# Patient Record
Sex: MALE | URBAN - METROPOLITAN AREA
[De-identification: names, ages, dates, MRNs, and addresses within clinical notes are randomized per-mention and may not be internally consistent; named-entity substitution may affect disease eponyms.]

---

## 2018-05-29 ENCOUNTER — HISTORICAL (OUTPATIENT)
Dept: ADMINISTRATIVE | Facility: HOSPITAL | Age: 23
End: 2018-05-29

## 2018-05-29 LAB
ABS NEUT (OLG): 2.8 X10(3)/MCL (ref 2.1–9.2)
APPEARANCE, UA: CLEAR
BACTERIA #/AREA URNS AUTO: ABNORMAL /[HPF]
BASOPHILS # BLD AUTO: 0.02 X10(3)/MCL
BASOPHILS NFR BLD AUTO: 0 %
BILIRUB UR QL STRIP: NEGATIVE
BUN SERPL-MCNC: 16 MG/DL (ref 7–18)
CALCIUM SERPL-MCNC: 9.1 MG/DL (ref 8.5–10.1)
CHLORIDE SERPL-SCNC: 105 MMOL/L (ref 98–107)
CO2 SERPL-SCNC: 30 MMOL/L (ref 21–32)
COLOR UR: YELLOW
CREAT SERPL-MCNC: 0.9 MG/DL (ref 0.6–1.3)
CREAT/UREA NIT SERPL: 18
EOSINOPHIL # BLD AUTO: 0.16 10*3/UL
EOSINOPHIL NFR BLD AUTO: 3 %
ERYTHROCYTE [DISTWIDTH] IN BLOOD BY AUTOMATED COUNT: 11.8 % (ref 11.5–14.5)
EST. AVERAGE GLUCOSE BLD GHB EST-MCNC: 108 MG/DL
GLUCOSE (UA): NORMAL
GLUCOSE SERPL-MCNC: 88 MG/DL (ref 74–106)
HAV IGM SERPL QL IA: NONREACTIVE
HBA1C MFR BLD: 5.4 % (ref 4.2–6.3)
HBV CORE IGM SERPL QL IA: NONREACTIVE
HBV SURFACE AG SERPL QL IA: NEGATIVE
HCT VFR BLD AUTO: 46.9 % (ref 40–51)
HCV AB SERPL QL IA: NONREACTIVE
HGB BLD-MCNC: 15.3 GM/DL (ref 13.5–17.5)
HGB UR QL STRIP: NEGATIVE
HIV 1+2 AB+HIV1 P24 AG SERPL QL IA: NONREACTIVE
HYALINE CASTS #/AREA URNS LPF: ABNORMAL /[LPF]
IMM GRANULOCYTES # BLD AUTO: 0.02 10*3/UL
IMM GRANULOCYTES NFR BLD AUTO: 0 %
KETONES UR QL STRIP: NEGATIVE
LEUKOCYTE ESTERASE UR QL STRIP: NEGATIVE
LYMPHOCYTES # BLD AUTO: 2.56 X10(3)/MCL
LYMPHOCYTES NFR BLD AUTO: 43 % (ref 13–40)
MCH RBC QN AUTO: 27.5 PG (ref 26–34)
MCHC RBC AUTO-ENTMCNC: 32.6 GM/DL (ref 31–37)
MCV RBC AUTO: 84.2 FL (ref 80–100)
MONOCYTES # BLD AUTO: 0.43 X10(3)/MCL
MONOCYTES NFR BLD AUTO: 7 % (ref 4–12)
NEUTROPHILS # BLD AUTO: 2.8 X10(3)/MCL
NEUTROPHILS NFR BLD AUTO: 47 X10(3)/MCL
NITRITE UR QL STRIP: NEGATIVE
PH UR STRIP: 6 [PH] (ref 4.5–8)
PLATELET # BLD AUTO: 261 X10(3)/MCL (ref 130–400)
PMV BLD AUTO: 9 FL (ref 7.4–10.4)
POTASSIUM SERPL-SCNC: 4.1 MMOL/L (ref 3.5–5.1)
PROT UR QL STRIP: 10 MG/DL
RBC # BLD AUTO: 5.57 X10(6)/MCL (ref 4.5–5.9)
RBC #/AREA URNS AUTO: ABNORMAL /[HPF]
SODIUM SERPL-SCNC: 141 MMOL/L (ref 136–145)
SP GR UR STRIP: 1.03 (ref 1–1.03)
SQUAMOUS #/AREA URNS LPF: ABNORMAL /[LPF]
UROBILINOGEN UR STRIP-ACNC: NORMAL
WBC # SPEC AUTO: 6 X10(3)/MCL (ref 4.5–11)
WBC #/AREA URNS AUTO: ABNORMAL /HPF

## 2021-05-19 ENCOUNTER — HISTORICAL (OUTPATIENT)
Dept: INTERNAL MEDICINE | Facility: CLINIC | Age: 26
End: 2021-05-19

## 2021-05-19 LAB
ABS NEUT (OLG): 3.11 X10(3)/MCL (ref 2.1–9.2)
ALBUMIN SERPL-MCNC: 4.3 GM/DL (ref 3.5–5)
ALBUMIN/GLOB SERPL: 1.2 RATIO (ref 1.1–2)
ALP SERPL-CCNC: 81 UNIT/L (ref 40–150)
ALT SERPL-CCNC: 34 UNIT/L (ref 0–55)
AST SERPL-CCNC: 17 UNIT/L (ref 5–34)
BASOPHILS # BLD AUTO: 0 X10(3)/MCL (ref 0–0.2)
BASOPHILS NFR BLD AUTO: 0 %
BILIRUB SERPL-MCNC: 0.6 MG/DL
BILIRUBIN DIRECT+TOT PNL SERPL-MCNC: 0.2 MG/DL (ref 0–0.5)
BILIRUBIN DIRECT+TOT PNL SERPL-MCNC: 0.4 MG/DL (ref 0–0.8)
BUN SERPL-MCNC: 13.7 MG/DL (ref 8.9–20.6)
CALCIUM SERPL-MCNC: 9.6 MG/DL (ref 8.4–10.2)
CHLORIDE SERPL-SCNC: 107 MMOL/L (ref 98–107)
CO2 SERPL-SCNC: 26 MMOL/L (ref 22–29)
CREAT SERPL-MCNC: 0.95 MG/DL (ref 0.73–1.18)
EOSINOPHIL # BLD AUTO: 0.2 X10(3)/MCL (ref 0–0.9)
EOSINOPHIL NFR BLD AUTO: 2 %
ERYTHROCYTE [DISTWIDTH] IN BLOOD BY AUTOMATED COUNT: 12 % (ref 11.5–14.5)
EST. AVERAGE GLUCOSE BLD GHB EST-MCNC: 102.5 MG/DL
GLOBULIN SER-MCNC: 3.5 GM/DL (ref 2.4–3.5)
GLUCOSE SERPL-MCNC: 95 MG/DL (ref 74–100)
HBA1C MFR BLD: 5.2 %
HCT VFR BLD AUTO: 48 % (ref 40–51)
HGB BLD-MCNC: 15.5 GM/DL (ref 13.5–17.5)
IMM GRANULOCYTES # BLD AUTO: 0.03 10*3/UL
IMM GRANULOCYTES NFR BLD AUTO: 0 %
LYMPHOCYTES # BLD AUTO: 3.2 X10(3)/MCL (ref 0.6–4.6)
LYMPHOCYTES NFR BLD AUTO: 46 %
MCH RBC QN AUTO: 27.6 PG (ref 26–34)
MCHC RBC AUTO-ENTMCNC: 32.3 GM/DL (ref 31–37)
MCV RBC AUTO: 85.6 FL (ref 80–100)
MONOCYTES # BLD AUTO: 0.4 X10(3)/MCL (ref 0.1–1.3)
MONOCYTES NFR BLD AUTO: 6 %
NEUTROPHILS # BLD AUTO: 3.11 X10(3)/MCL (ref 2.1–9.2)
NEUTROPHILS NFR BLD AUTO: 44 %
PLATELET # BLD AUTO: 283 X10(3)/MCL (ref 130–400)
PMV BLD AUTO: 8.6 FL (ref 7.4–10.4)
POTASSIUM SERPL-SCNC: 3.7 MMOL/L (ref 3.5–5.1)
PROT SERPL-MCNC: 7.8 GM/DL (ref 6.4–8.3)
RBC # BLD AUTO: 5.61 X10(6)/MCL (ref 4.5–5.9)
SODIUM SERPL-SCNC: 142 MMOL/L (ref 136–145)
WBC # SPEC AUTO: 7 X10(3)/MCL (ref 4.5–11)

## 2022-04-10 ENCOUNTER — HISTORICAL (OUTPATIENT)
Dept: ADMINISTRATIVE | Facility: HOSPITAL | Age: 27
End: 2022-04-10

## 2022-04-28 VITALS
BODY MASS INDEX: 34.01 KG/M2 | DIASTOLIC BLOOD PRESSURE: 71 MMHG | SYSTOLIC BLOOD PRESSURE: 104 MMHG | WEIGHT: 242.94 LBS | HEIGHT: 71 IN

## 2022-05-03 NOTE — HISTORICAL [facility]ER
This is a historical note converted from [employer]. Formatting and pictures may have been removed.  Please reference [employer] for original formatting and attached multimedia. Chief Complaint  ed follow up  History of Present Illness  22 yo  male presents today for routine f/up appt. Pt c/o acne to back and face sine? ~ 2010 for which he was treated here for acne. Reports over the years the back acne has worsened but facial acne is controlled with washing with cold water. Denies drainage, oozing, bleeding. 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 Denies tobacco, alcohol, illicit drug abuse. Exercises, drinks plenty of water, consumes moderately healthy diet. Denies fever, chills, night sweats, N/V, HA, blurry vision, dizziness, CP, SOB, or any other problems or concerns this visit.  Review of Systems  Constitutional: Negative.  Ear/Nose/Mouth/Throat: Negative.  Respiratory: No shortness of breath, No cough, No sputum production, No hemoptysis, No wheezing.  Cardiovascular: No chest pain, No palpitations, No peripheral edema, No syncope.  Breast: Negative.  Gastrointestinal: No nausea, No vomiting, No diarrhea, No constipation, No hematemesis Abdominal pain: All quadrants.  Genitourinary: Negative.  Hematology/Lymphatics: Negative.  Endocrine: Negative.  Immunologic: Negative.  Musculoskeletal: No back pain, No neck pain, No joint pain, No muscle pain, No decreased range of motion.  Integumentary: See HPI  Neurologic: Negative except as documented in history of present illness.  Physical Exam  Vitals & Measurements  T:?36.6? ?C (Oral)? HR:?78(Peripheral)? RR:?20? BP:?98/66?  HT:?180.5?cm? HT:?180.5?cm? WT:?99.8?kg? WT:?99.8?kg? BMI:?30.63?  General: Alert and oriented, No acute distress._  Eye: Pupils are equal, round and reactive to light, Extraocular movements are intact.  HENT: Normocephalic.  Neck: Supple, Non-tender, No carotid bruit, No lymphadenopathy.  Respiratory: Lungs are clear to  auscultation, Respirations are non-labored, Breath sounds are equal, Symmetrical chest wall expansion.  Cardiovascular: Normal rate, Regular rhythm, No murmur.  Gastrointestinal: Soft, Non-tender, Non-distended, Normal bowel sounds.  Musculoskeletal: Normal range of motion.  Integumentary: Warm, Dry, Intact. Large area to right flank, brown with hair. Multiple old acne scars to entire back, some nodules, no pustules noted.  Neurologic: No focal deficits, Cranial Nerves II-XII are grossly intact.  Assessment/Plan  1.?Well adult exam  ? Tdap today  Fasting labs  Ordered:  tetanus/diphth/pertuss (Tdap) adult/adol, 0.5 mL, form: Injection, IM, Once-Unscheduled, first dose 05/29/18 9:06:00 CDT  Basic Metabolic Panel, Routine collect, *Est. 05/29/18 3:00:00 CDT, Blood, Order for future visit, *Est. Stop date 05/29/18 3:00:00 CDT, Lab Collect, Well adult exam, 05/29/18 9:17:00 CDT  CBC w/ Auto Diff, Routine collect, *Est. 05/29/18 3:00:00 CDT, Blood, Order for future visit, *Est. Stop date 05/29/18 3:00:00 CDT, Lab Collect, Well adult exam, 05/29/18 9:17:00 CDT  Clinic Follow up, *Est. 08/29/18 3:00:00 CDT, in 3 mos with KELLEE Ladd, Order for future visit, Acne  Well adult exam, [facility]  Hemoglobin A1C [facility], Routine collect, *Est. 05/29/18 3:00:00 CDT, Blood, Order for future visit, *Est. Stop date 05/29/18 3:00:00 CDT, Lab Collect, Well adult exam, 05/29/18 9:17:00 CDT  Hepatitis Panel [facility], Routine collect, *Est. 05/29/18 3:00:00 CDT, Blood, Order for future visit, *Est. Stop date 05/29/18 3:00:00 CDT, Lab Collect, Well adult exam, 05/29/18 9:17:00 CDT  HIV 1 and 2, Routine collect, *Est. 05/29/18 3:00:00 CDT, Blood, Order for future visit, *Est. Stop date 05/29/18 3:00:00 CDT, Lab Collect, Well adult exam, 05/29/18 9:17:00 CDT  Office/Outpatient Visit Level 4 Established [id number] PC, Acne  Obesity  Well adult exam, [facility], 05/29/18 9:32:00 CDT  Urinalysis with Microscopic if Indicated, Routine collect,  Urine, Order for future visit, *Est. 05/29/18 3:00:00 CDT, *Est. Stop date 05/29/18 3:00:00 CDT, Nurse collect, Well adult exam, 05/29/18 9:17:00 CDT  ?  2.?Obesity  ? Encouraged weight loss via portion control and exercise. Handout given.  Ordered:  Office/Outpatient Visit Level 4 Established [id number] PC, Acne  Obesity  Well adult exam, [facility], 05/29/18 9:32:00 CDT  ?  3.?Acne  ?Continue current regimen for facial acne.  RX: Doxycycline for back  Ordered:  doxycycline, 50 mg = 1 cap(s), Oral, BID, X 14 day(s), # 28 cap(s), 1 Refill(s), Pharmacy: [id number]  Clinic Follow up, *Est. 08/29/18 3:00:00 CDT, in 3 mos with KELLEE Ladd, Order for future visit, Acne  Well adult exam, [facility]  Office/Outpatient Visit Level 4 Established [id number] PC, Acne  Obesity  Well adult exam, [facility], 05/29/18 9:32:00 CDT  ?   Problem List/Past Medical History  Ongoing  Acne  Obesity  Well adult exam  Historical  No qualifying data  Procedure/Surgical History  none.  Medications  Advil 200 mg oral tablet, 200 mg= 1 tab(s), Oral, q6hr  benzoyl peroxide 2.5% topical gel, 1 anjali, TOP, Daily,? ?Not Taking, Completed Rx  Boostrix (Tdap) intramuscular suspension, 0.5 mL, IM, Once-Unscheduled  Tessalon Perles 100 mg oral capsule, 100 mg= 1 cap(s), Oral, TID, PRN,? ?Not Taking, Completed Rx  Allergies  No Known Medication Allergies  Social History  Alcohol  Never, 05/17/2016  Employment/School  Employed, Student, 05/29/2018  Home/Environment  Lives with Father, Mother, Siblings. Living situation: Home/Independent. Alcohol abuse in household: No. Substance abuse in household: No. Smoker in household: No. Injuries/Abuse/Neglect in household: No. Feels unsafe at home: No. Safe place to go: Yes. Agency(s)/Others notified: No. Family/Friends available for support: No. Concern for family members at home: No. Major illness in household: No. Financial concerns: No. TV/Computer concerns: No. Risks in environment: Pets/Animal  exposure., 05/29/2018  Substance Abuse  Never, 05/17/2016  Tobacco  Never smoker, 05/17/2016  Family History  Primary malignant neoplasm of breast: Mother.  Immunizations  Vaccine Date Status   tetanus-diphtheria toxoids 03/05/2007 Recorded       Pt changed his mind on Tdap, uncertain if he took in 3 years ago in ER in [place]. Will call to inform us.

## 2025-07-18 ENCOUNTER — HOSPITAL ENCOUNTER (EMERGENCY)
Facility: HOSPITAL | Age: 30
Discharge: HOME | End: 2025-07-18
Attending: FAMILY MEDICINE

## 2025-07-18 VITALS
TEMPERATURE: 98 F | OXYGEN SATURATION: 99 % | WEIGHT: 258.19 LBS | SYSTOLIC BLOOD PRESSURE: 151 MMHG | HEART RATE: 85 BPM | BODY MASS INDEX: 36.15 KG/M2 | DIASTOLIC BLOOD PRESSURE: 88 MMHG | RESPIRATION RATE: 20 BRPM

## 2025-07-18 DIAGNOSIS — T14.8XXA SUPERFICIAL LACERATION: Primary | ICD-10-CM

## 2025-07-18 DIAGNOSIS — Z23 ENCOUNTER FOR IMMUNIZATION: ICD-10-CM

## 2025-07-18 PROCEDURE — 63600175 PHARM REV CODE 636 W HCPCS: Performed by: NURSE PRACTITIONER

## 2025-07-18 PROCEDURE — 99283 EMERGENCY DEPT VISIT LOW MDM: CPT | Mod: 25

## 2025-07-18 PROCEDURE — 90471 IMMUNIZATION ADMIN: CPT | Performed by: NURSE PRACTITIONER

## 2025-07-18 PROCEDURE — 90715 TDAP VACCINE 7 YRS/> IM: CPT | Performed by: NURSE PRACTITIONER

## 2025-07-18 RX ADMIN — CLOSTRIDIUM TETANI TOXOID ANTIGEN (FORMALDEHYDE INACTIVATED), CORYNEBACTERIUM DIPHTHERIAE TOXOID ANTIGEN (FORMALDEHYDE INACTIVATED), BORDETELLA PERTUSSIS TOXOID ANTIGEN (GLUTARALDEHYDE INACTIVATED), BORDETELLA PERTUSSIS FILAMENTOUS HEMAGGLUTININ ANTIGEN (FORMALDEHYDE INACTIVATED), BORDETELLA PERTUSSIS PERTACTIN ANTIGEN, AND BORDETELLA PERTUSSIS FIMBRIAE 2/3 ANTIGEN 0.5 ML: 5; 2; 2.5; 5; 3; 5 INJECTION, SUSPENSION INTRAMUSCULAR at 01:07

## 2025-07-18 NOTE — DISCHARGE INSTRUCTIONS
Clean the wound twice daily with soap and water.  Then may apply over-the-counter bacitracin and place a Band-Aid around the area until healed.  Return for any concerns.

## 2025-07-18 NOTE — ED PROVIDER NOTES
Encounter Date: 7/18/2025       History     Chief Complaint   Patient presents with    Foreign Body     Thinks he has a piece of glass in his right ring finger     30-year-old male with no past medical history presents with complaints of concern for right 4th finger potential foreign body.  States was cleaning the floor this morning.  While wiping up the floor with a Clorox wipe cut his finger and have forgotten that he had previously dropped a glass that had broken several days ago.  States unknown last Tdap.  Denies any pain with flexion or extension of the finger.  Denies any numbness or paresthesia.    The history is provided by the patient. No  was used.     Review of patient's allergies indicates:  No Known Allergies  History reviewed. No pertinent past medical history.  History reviewed. No pertinent surgical history.  No family history on file.  Social History[1]  Review of Systems   Constitutional:  Negative for fever.   HENT:  Negative for sore throat.    Respiratory:  Negative for shortness of breath.    Cardiovascular:  Negative for chest pain.   Gastrointestinal:  Negative for nausea.   Genitourinary:  Negative for dysuria.   Musculoskeletal:  Negative for arthralgias, back pain and myalgias.   Skin:  Positive for wound. Negative for rash.   Neurological:  Negative for weakness and headaches.   Hematological:  Does not bruise/bleed easily.       Physical Exam     Initial Vitals [07/18/25 1052]   BP Pulse Resp Temp SpO2   (!) 151/88 85 20 98.3 °F (36.8 °C) 99 %      MAP       --         Physical Exam    Nursing note and vitals reviewed.  Constitutional: He appears well-developed and well-nourished.   HENT:   Head: Normocephalic and atraumatic.   Eyes: EOM are normal. Pupils are equal, round, and reactive to light.   Neck: Neck supple.   Normal range of motion.  Cardiovascular:  Normal rate, regular rhythm, normal heart sounds and intact distal pulses.           Pulmonary/Chest: Breath  sounds normal.   Abdominal: Abdomen is soft. Bowel sounds are normal.   Musculoskeletal:         General: Normal range of motion.      Cervical back: Normal range of motion and neck supple.     Neurological: He is alert and oriented to person, place, and time. He has normal strength and normal reflexes. GCS score is 15. GCS eye subscore is 4. GCS verbal subscore is 5. GCS motor subscore is 6.   Skin: Skin is warm and dry. Capillary refill takes less than 2 seconds. Laceration noted.   Superficial laceration to the right 4th phalanx palmar surface between the the IP and PIP measuring approximately a quarter of a cm involving only the dermis.   Psychiatric: He has a normal mood and affect. His behavior is normal. Thought content normal.         ED Course   Procedures  Labs Reviewed - No data to display       Imaging Results              X-Ray Finger 2 or More Views Right (Final result)  Result time 07/18/25 11:49:05      Final result by Akin Briceno MD (07/18/25 11:49:05)                   Impression:      No radiopaque foreign body      Electronically signed by: Akin Briceno MD  Date:    07/18/2025  Time:    11:49               Narrative:    EXAMINATION:  Three views right hand    CLINICAL HISTORY:  Possible foreign body    COMPARISON:  None    FINDINGS:  Three images of the ring finger show no fracture or dislocation.  No radiopaque foreign body.                                       Medications   Tdap vaccine injection 0.5 mL (has no administration in time range)     Medical Decision Making  30-year-old male with superficial laceration of the right hand 4th phalanx concerned for potential foreign body.  Differentials:  Foreign body of the right 4th phalanx, superficial laceration, right hand pain, encounter for immunization.    Amount and/or Complexity of Data Reviewed  Radiology: ordered.               ED Course as of 07/18/25 1306   Fri Jul 18, 2025   1303 Given strict ED return precautions. I have spoken  with the patient and/or caregivers. I have explained the patient's condition, diagnoses and treatment plan based on the information available to me at this time. I have answered the patient's and/or caregiver's questions and addressed any concerns. The patient and/or caregivers have as good an understanding of the patient's diagnosis, condition and treatment plan as can be expected at this point. The vital signs have been stable. The patient's condition is stable and appropriate for discharge from the emergency department.      The patient will pursue further outpatient evaluation with the primary care physician or other designated or consulting physician as outlined in the discharge instructions. The patient and/or caregivers are agreeable to this plan of care and follow-up instructions have been explained in detail. The patient and/or caregivers have received these instructions in written format and have expressed an understanding of the discharge instructions. The patient and/or caregivers are aware that any significant change in condition or worsening of symptoms should prompt an immediate return to this or the closest emergency department or a call to 911.  [FS]      ED Course User Index  [FS] Riri Roy FNP                               Clinical Impression:  Final diagnoses:  [T14.8XXA] Superficial laceration (Primary)  [Z23] Encounter for immunization          ED Disposition Condition    Discharge Stable          ED Prescriptions    None       Follow-up Information       Follow up With Specialties Details Why Contact Info    [facility] Emergency Medicine  If symptoms worsen [address] [facility] [id number] [phone]      In 3 days  Primary care                   [1]   Social History  Tobacco Use    Smoking status: Never    Smokeless tobacco: Never        Riri Roy FNP  07/18/25 4501